# Patient Record
Sex: FEMALE | Race: OTHER | HISPANIC OR LATINO
[De-identification: names, ages, dates, MRNs, and addresses within clinical notes are randomized per-mention and may not be internally consistent; named-entity substitution may affect disease eponyms.]

---

## 2017-05-15 ENCOUNTER — RESULT REVIEW (OUTPATIENT)
Age: 32
End: 2017-05-15

## 2018-01-17 ENCOUNTER — RESULT REVIEW (OUTPATIENT)
Age: 33
End: 2018-01-17

## 2018-05-11 ENCOUNTER — EMERGENCY (EMERGENCY)
Facility: HOSPITAL | Age: 33
LOS: 0 days | Discharge: HOME | End: 2018-05-11
Attending: EMERGENCY MEDICINE | Admitting: EMERGENCY MEDICINE
Payer: MEDICAID

## 2018-05-11 VITALS
TEMPERATURE: 98 F | HEART RATE: 106 BPM | OXYGEN SATURATION: 100 % | DIASTOLIC BLOOD PRESSURE: 97 MMHG | RESPIRATION RATE: 18 BRPM | SYSTOLIC BLOOD PRESSURE: 134 MMHG

## 2018-05-11 VITALS — WEIGHT: 130.07 LBS | HEIGHT: 63 IN

## 2018-05-11 DIAGNOSIS — R31.9 HEMATURIA, UNSPECIFIED: ICD-10-CM

## 2018-05-11 DIAGNOSIS — B96.20 UNSPECIFIED ESCHERICHIA COLI [E. COLI] AS THE CAUSE OF DISEASES CLASSIFIED ELSEWHERE: ICD-10-CM

## 2018-05-11 DIAGNOSIS — N39.0 URINARY TRACT INFECTION, SITE NOT SPECIFIED: ICD-10-CM

## 2018-05-11 LAB
APPEARANCE UR: CLEAR — SIGNIFICANT CHANGE UP
BACTERIA # UR AUTO: (no result)
BILIRUB UR-MCNC: NEGATIVE — SIGNIFICANT CHANGE UP
COLOR SPEC: (no result)
DIFF PNL FLD: (no result)
EPI CELLS # UR: SIGNIFICANT CHANGE UP
GLUCOSE UR QL: NEGATIVE MG/DL — SIGNIFICANT CHANGE UP
KETONES UR-MCNC: NEGATIVE — SIGNIFICANT CHANGE UP
LEUKOCYTE ESTERASE UR-ACNC: (no result)
NITRITE UR-MCNC: NEGATIVE — SIGNIFICANT CHANGE UP
PH UR: 8 — SIGNIFICANT CHANGE UP (ref 5–8)
PROT UR-MCNC: 100 MG/DL
RBC CASTS # UR COMP ASSIST: SIGNIFICANT CHANGE UP /HPF (ref 0–4)
SP GR SPEC: 1.01 — SIGNIFICANT CHANGE UP (ref 1.01–1.02)
UROBILINOGEN FLD QL: NEGATIVE MG/DL — SIGNIFICANT CHANGE UP
WBC UR QL: (no result)

## 2018-05-11 PROCEDURE — 99283 EMERGENCY DEPT VISIT LOW MDM: CPT

## 2018-05-11 RX ORDER — PHENAZOPYRIDINE HCL 100 MG
1 TABLET ORAL
Qty: 5 | Refills: 0
Start: 2018-05-11

## 2018-05-11 RX ORDER — NITROFURANTOIN MACROCRYSTAL 50 MG
1 CAPSULE ORAL
Qty: 14 | Refills: 0
Start: 2018-05-11 | End: 2018-05-17

## 2018-05-11 RX ORDER — PHENAZOPYRIDINE HCL 100 MG
200 TABLET ORAL ONCE
Qty: 0 | Refills: 0 | Status: COMPLETED | OUTPATIENT
Start: 2018-05-11 | End: 2018-05-11

## 2018-05-11 RX ORDER — NITROFURANTOIN MACROCRYSTAL 50 MG
100 CAPSULE ORAL
Qty: 0 | Refills: 0 | Status: DISCONTINUED | OUTPATIENT
Start: 2018-05-11 | End: 2018-05-11

## 2018-05-11 RX ADMIN — Medication 200 MILLIGRAM(S): at 22:56

## 2018-05-11 RX ADMIN — Medication 100 MILLIGRAM(S): at 22:56

## 2018-05-11 NOTE — ED STATDOCS - PROGRESS NOTE DETAILS
reviewed us results with patient, ucg negative, +RBC and WBC.  Will treat UTI with macrobid and pyridium for the pain -Marcie Velasco PA-C

## 2019-03-12 ENCOUNTER — RESULT REVIEW (OUTPATIENT)
Age: 34
End: 2019-03-12

## 2019-05-28 ENCOUNTER — APPOINTMENT (OUTPATIENT)
Dept: ULTRASOUND IMAGING | Facility: CLINIC | Age: 34
End: 2019-05-28
Payer: COMMERCIAL

## 2019-05-28 ENCOUNTER — OUTPATIENT (OUTPATIENT)
Dept: OUTPATIENT SERVICES | Facility: HOSPITAL | Age: 34
LOS: 1 days | End: 2019-05-28
Payer: COMMERCIAL

## 2019-05-28 DIAGNOSIS — Z00.8 ENCOUNTER FOR OTHER GENERAL EXAMINATION: ICD-10-CM

## 2019-05-28 PROCEDURE — 76641 ULTRASOUND BREAST COMPLETE: CPT | Mod: 26,RT

## 2019-05-28 PROCEDURE — 76641 ULTRASOUND BREAST COMPLETE: CPT

## 2021-05-18 ENCOUNTER — TRANSCRIPTION ENCOUNTER (OUTPATIENT)
Age: 36
End: 2021-05-18

## 2021-05-25 ENCOUNTER — EMERGENCY (EMERGENCY)
Facility: HOSPITAL | Age: 36
LOS: 0 days | Discharge: HOME | End: 2021-05-25
Attending: HOSPITALIST
Payer: MEDICAID

## 2021-05-25 VITALS — HEIGHT: 63 IN | WEIGHT: 139.99 LBS

## 2021-05-25 VITALS
RESPIRATION RATE: 17 BRPM | DIASTOLIC BLOOD PRESSURE: 84 MMHG | SYSTOLIC BLOOD PRESSURE: 119 MMHG | TEMPERATURE: 98 F | HEART RATE: 67 BPM | OXYGEN SATURATION: 100 %
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
## 2021-05-25 DIAGNOSIS — M25.562 PAIN IN LEFT KNEE: ICD-10-CM
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
## 2021-05-25 PROCEDURE — 99283 EMERGENCY DEPT VISIT LOW MDM: CPT

## 2021-05-25 PROCEDURE — 99282 EMERGENCY DEPT VISIT SF MDM: CPT

## 2021-05-25 NOTE — ED STATDOCS - CLINICAL SUMMARY MEDICAL DECISION MAKING FREE TEXT BOX
36F with left knee pain s/p mechanical fall 1 weeks ago. negative xrays. prn pain meds, ace support wrap, outpt f/u with Orthopedics.

## 2021-05-25 NOTE — ED STATDOCS - PATIENT PORTAL LINK FT
You can access the FollowMyHealth Patient Portal offered by [facility] by registering at the following website: [email or web address]. By joining [employer]’s FollowMyHealth portal, you will also be able to view your health information using other applications (apps) compatible with our system.

## 2021-05-25 NOTE — ED STATDOCS - CARE PROVIDER_API CALL
Brett Rogers (DO)  Orthopaedic Surgery  [address] [place] [address]  Phone: [phone]  Fax: [phone]  Follow Up Time:

## 2021-05-25 NOTE — ED STATDOCS - PROGRESS NOTE DETAILS
37 yo female presents with sister for L knee pain. Pt with mechanical trip and fall, where she inverted her ankle and hurt her knee pop. She had xrs which was unremarkable but states she hears something in her knee. Pt has been walking on it and is currently wearing a brace. Offered another XR but pt refused. WIll place name in referral book so she could have f/u with ortho. Pt has been taking ibuprofen and tylenol for pain. -Pancho Choi PA-C

## 2021-05-25 NOTE — ED STATDOCS - OBJECTIVE STATEMENT
36F with left knee pain s/p mechanical fall 1 weeks ago. went to urgent care, negative xrays of left knee and ankle. has been wearing an ace wrap and taking ibuprofen 600mg 2 times a day. states knee has been still hurting her. ambulating independently.

## 2021-05-25 NOTE — ED STATDOCS - NSFOLLOWUPINSTRUCTIONS_ED_ALL_ED_FT
Take tylenol as needed for pain, 650Mg every 6-8 hours.  You can also take ibuprofen as needed for pain, 600mg every 6-8 hours, take with food. Take tylenol as needed for pain, 650Mg every 6-8 hours.  You can also take ibuprofen as needed for pain, 600mg every 6-8 hours, take with food.    Someone from the hospital will call to set up an appointment with an orthopedist.     Return to the ER for any new or other concerns.

## 2021-05-28 ENCOUNTER — OUTPATIENT (OUTPATIENT)
Dept: OUTPATIENT SERVICES | Facility: HOSPITAL | Age: 36
LOS: 1 days | End: 2021-05-28
Payer: COMMERCIAL

## 2021-05-28 ENCOUNTER — APPOINTMENT (OUTPATIENT)
Dept: MRI IMAGING | Facility: CLINIC | Age: 36
End: 2021-05-28
Payer: COMMERCIAL

## 2021-05-28 DIAGNOSIS — Z00.8 ENCOUNTER FOR OTHER GENERAL EXAMINATION: ICD-10-CM

## 2021-05-28 PROCEDURE — 73721 MRI JNT OF LWR EXTRE W/O DYE: CPT | Mod: 26,LT

## 2021-05-28 PROCEDURE — 73721 MRI JNT OF LWR EXTRE W/O DYE: CPT

## 2021-08-25 NOTE — ED ADULT NURSE NOTE - CCCP TRG CHIEF CMPLNT
Cardiology has tried to contact pt and has mailed this pt a letter for her to contact the office about an appt.  urinary symptoms

## 2021-09-15 ENCOUNTER — OUTPATIENT (OUTPATIENT)
Dept: OUTPATIENT SERVICES | Facility: HOSPITAL | Age: 36
LOS: 1 days | End: 2021-09-15
Payer: SELF-PAY

## 2021-09-15 ENCOUNTER — APPOINTMENT (OUTPATIENT)
Dept: ORTHOPEDIC SURGERY | Facility: HOSPITAL | Age: 36
End: 2021-09-15

## 2021-09-15 VITALS
HEART RATE: 64 BPM | TEMPERATURE: 96.3 F | DIASTOLIC BLOOD PRESSURE: 86 MMHG | SYSTOLIC BLOOD PRESSURE: 130 MMHG | HEIGHT: 60 IN | WEIGHT: 120 LBS | BODY MASS INDEX: 23.56 KG/M2

## 2021-09-15 DIAGNOSIS — M79.643 PAIN IN UNSPECIFIED HAND: ICD-10-CM

## 2021-09-15 DIAGNOSIS — S83.512A SPRAIN OF ANTERIOR CRUCIATE LIGAMENT OF LEFT KNEE, INITIAL ENCOUNTER: ICD-10-CM

## 2021-09-15 PROCEDURE — G0463: CPT

## 2021-09-26 ENCOUNTER — OUTPATIENT (OUTPATIENT)
Dept: OUTPATIENT SERVICES | Facility: HOSPITAL | Age: 36
LOS: 1 days | End: 2021-09-26
Payer: COMMERCIAL

## 2021-09-26 ENCOUNTER — APPOINTMENT (OUTPATIENT)
Dept: ULTRASOUND IMAGING | Facility: CLINIC | Age: 36
End: 2021-09-26
Payer: COMMERCIAL

## 2021-09-26 DIAGNOSIS — Z01.411 ENCOUNTER FOR GYNECOLOGICAL EXAMINATION (GENERAL) (ROUTINE) WITH ABNORMAL FINDINGS: ICD-10-CM

## 2021-09-26 DIAGNOSIS — N92.0 EXCESSIVE AND FREQUENT MENSTRUATION WITH REGULAR CYCLE: ICD-10-CM

## 2021-09-26 PROCEDURE — 76856 US EXAM PELVIC COMPLETE: CPT

## 2021-09-26 PROCEDURE — 76830 TRANSVAGINAL US NON-OB: CPT | Mod: 26

## 2021-09-26 PROCEDURE — 76830 TRANSVAGINAL US NON-OB: CPT

## 2021-09-26 PROCEDURE — 76856 US EXAM PELVIC COMPLETE: CPT | Mod: 26

## 2021-09-27 ENCOUNTER — NON-APPOINTMENT (OUTPATIENT)
Age: 36
End: 2021-09-27

## 2021-09-27 ENCOUNTER — APPOINTMENT (OUTPATIENT)
Dept: ORTHOPEDIC SURGERY | Facility: CLINIC | Age: 36
End: 2021-09-27
Payer: MEDICAID

## 2021-09-27 VITALS
BODY MASS INDEX: 25.52 KG/M2 | SYSTOLIC BLOOD PRESSURE: 143 MMHG | HEART RATE: 75 BPM | WEIGHT: 130 LBS | HEIGHT: 60 IN | DIASTOLIC BLOOD PRESSURE: 83 MMHG

## 2021-09-27 DIAGNOSIS — S83.512D SPRAIN OF ANTERIOR CRUCIATE LIGAMENT OF LEFT KNEE, SUBSEQUENT ENCOUNTER: ICD-10-CM

## 2021-09-27 PROCEDURE — 99024 POSTOP FOLLOW-UP VISIT: CPT

## 2021-10-02 ENCOUNTER — EMERGENCY (EMERGENCY)
Facility: HOSPITAL | Age: 36
LOS: 0 days | Discharge: HOME | End: 2021-10-02
Attending: STUDENT IN AN ORGANIZED HEALTH CARE EDUCATION/TRAINING PROGRAM
Payer: MEDICAID

## 2021-10-02 VITALS
SYSTOLIC BLOOD PRESSURE: 123 MMHG | HEART RATE: 59 BPM | WEIGHT: 130.07 LBS | HEIGHT: 63 IN | TEMPERATURE: 98 F | RESPIRATION RATE: 18 BRPM | DIASTOLIC BLOOD PRESSURE: 85 MMHG | OXYGEN SATURATION: 100 %

## 2021-10-02 DIAGNOSIS — N39.0 URINARY TRACT INFECTION, SITE NOT SPECIFIED: ICD-10-CM

## 2021-10-02 DIAGNOSIS — R35.0 FREQUENCY OF MICTURITION: ICD-10-CM

## 2021-10-02 DIAGNOSIS — R11.0 NAUSEA: ICD-10-CM

## 2021-10-02 DIAGNOSIS — M54.50 LOW BACK PAIN, UNSPECIFIED: ICD-10-CM

## 2021-10-02 LAB
APPEARANCE UR: CLEAR — SIGNIFICANT CHANGE UP
BILIRUB UR-MCNC: NEGATIVE — SIGNIFICANT CHANGE UP
COLOR SPEC: YELLOW — SIGNIFICANT CHANGE UP
DIFF PNL FLD: ABNORMAL
GLUCOSE UR QL: NEGATIVE MG/DL — SIGNIFICANT CHANGE UP
KETONES UR-MCNC: NEGATIVE — SIGNIFICANT CHANGE UP
LEUKOCYTE ESTERASE UR-ACNC: ABNORMAL
NITRITE UR-MCNC: NEGATIVE — SIGNIFICANT CHANGE UP
PH UR: 7 — SIGNIFICANT CHANGE UP (ref 5–8)
PROT UR-MCNC: 15 MG/DL
SP GR SPEC: 1.01 — SIGNIFICANT CHANGE UP (ref 1.01–1.02)
UROBILINOGEN FLD QL: NEGATIVE MG/DL — SIGNIFICANT CHANGE UP

## 2021-10-02 PROCEDURE — 81001 URINALYSIS AUTO W/SCOPE: CPT

## 2021-10-02 PROCEDURE — 87086 URINE CULTURE/COLONY COUNT: CPT

## 2021-10-02 PROCEDURE — 81025 URINE PREGNANCY TEST: CPT

## 2021-10-02 PROCEDURE — 99284 EMERGENCY DEPT VISIT MOD MDM: CPT

## 2021-10-02 PROCEDURE — 99283 EMERGENCY DEPT VISIT LOW MDM: CPT

## 2021-10-02 RX ORDER — CEPHALEXIN 500 MG
500 CAPSULE ORAL ONCE
Refills: 0 | Status: COMPLETED | OUTPATIENT
Start: 2021-10-02 | End: 2021-10-02

## 2021-10-02 RX ORDER — IBUPROFEN 200 MG
600 TABLET ORAL ONCE
Refills: 0 | Status: COMPLETED | OUTPATIENT
Start: 2021-10-02 | End: 2021-10-02

## 2021-10-02 RX ORDER — PHENAZOPYRIDINE HCL 100 MG
1 TABLET ORAL
Qty: 6 | Refills: 0
Start: 2021-10-02 | End: 2021-10-03

## 2021-10-02 RX ORDER — ACETAMINOPHEN 500 MG
975 TABLET ORAL ONCE
Refills: 0 | Status: COMPLETED | OUTPATIENT
Start: 2021-10-02 | End: 2021-10-02

## 2021-10-02 RX ORDER — LIDOCAINE 4 G/100G
1 CREAM TOPICAL ONCE
Refills: 0 | Status: COMPLETED | OUTPATIENT
Start: 2021-10-02 | End: 2021-10-02

## 2021-10-02 RX ORDER — CEPHALEXIN 500 MG
1 CAPSULE ORAL
Qty: 14 | Refills: 0
Start: 2021-10-02 | End: 2021-10-08

## 2021-10-02 RX ADMIN — Medication 600 MILLIGRAM(S): at 17:51

## 2021-10-02 RX ADMIN — LIDOCAINE 1 PATCH: 4 CREAM TOPICAL at 17:52

## 2021-10-02 RX ADMIN — Medication 500 MILLIGRAM(S): at 18:44

## 2021-10-02 RX ADMIN — Medication 975 MILLIGRAM(S): at 17:51

## 2021-10-02 NOTE — ED STATDOCS - PROGRESS NOTE DETAILS
Pt. is a 36 year old female presenting with lower back pain with urinary frequency.  Symptoms x 3 days.  Pyridium taken at home without relief.  Mild nausea today and chills.  Pt. currently menstruating.  Neg. Fever, dizziness or chest pain.  Denies edema. Denies dysuria, hematuria, BRBPR, tarry stools, diarrhea, constipation. Denies  discharge.  Tatyana Fu PA-C UTI on urinalysis.  Will treat with Keflex and urology follow up.   Pyridium at home.  Tatyana Fu PA-C

## 2021-10-02 NOTE — ED STATDOCS - ATTENDING CONTRIBUTION TO CARE
Harvinder ROCK: I performed the initial face to face bedside interview with this patient regarding history of present illness, review of symptoms and relevant past medical, social and family history.  I completed an independent physical examination.  I was the initial provider who evaluated this patient. I have signed out the follow up of any pending tests (i.e. labs, radiological studies) to the ACP.  I have communicated the patient’s plan of care and disposition with the ACP.  The history, relevant review of systems, past medical and surgical history, medical decision making, and physical examination was documented by the scribe in my presence and I attest to the accuracy of the documentation.

## 2021-10-02 NOTE — ED STATDOCS - NSFOLLOWUPINSTRUCTIONS_ED_ALL_ED_FT
Infección urinaria en los adultos    Urinary Tract Infection, Adult       Rabia infección urinaria (IU) puede ocurrir en cualquier lugar de las vías urinarias. Las vías urinarias incluyen a los riñones, los uréteres, la vejiga y la uretra. Estos órganos fabrican, almacenan y eliminan la orina del organismo.    El médico puede usar otras palabras para describir la infección. La IU katya afecta los uréteres y a los riñones (pielonefritis). La IU baja afecta la vejiga (cistitis) y la uretra (uretritis).      ¿Cuáles son las causas?    La mayoría de las infecciones de las vías urinarias es causada por la presencia de bacterias en la daniel genital, alrededor de la entrada de las vías urinarias (uretra). Estas bacterias proliferan y causan inflamación en las vías urinarias.      ¿Qué incrementa el riesgo?  Es más probable que contraiga esta afección si:  •Tiene colocado un catéter urinario (sonda urinaria) permanente.      •No puede controlar cuándo orinar o defecar (tiene incontinencia).    •Es marychuy y usted:  •Utiliza espermicida o diafragma forrest método anticonceptivo.      •Tiene niveles bajos de estrógenos.      •Están embarazadas.        •Tiene ciertos genes que aumentan guzman riesgo (genética).      •Es sexualmente activa.      •Kelsie antibióticos.    •Tiene rabia afección que causa que el flujo de orina sea lento, forrest:  •Próstata agrandada, si usted es hombre.      •Obstrucción de la uretra (estenosis).      •Cálculo renal.      •Rabia afección nerviosa que afecta el control de la vejiga (vejiga neurógena).      •No marci lo suficiente o no orina con frecuencia.      •Tiene ciertas enfermedades crónicas, forrest:  •Diabetes.      •Un sistema que combate las enfermedades (sistemainmunitario) debilitado.      •Anemia drepanocítica.      •Gota.      •Lesión en la médula tilley.          ¿Cuáles son los signos o los síntomas?  Los síntomas de esta afección incluyen:  •Necesidad inmediata (urgente) de orinar.      •Micción frecuente o eliminación de pequeñas cantidades de orina con frecuencia.      •Ardor o dolor al orinar.      •Presencia de johan en la orina.      •Orina con mal olor u olor atípico.      •Dificultad para orinar.      •Orina turbia.      •Secreción vaginal, si es marychuy.      •Dolor en el abdomen o en la parte inferior de la espalda.    Es posible que también tenga:  •Vómitos o disminución del apetito.      •Confusión.      •Irritabilidad o cansancio.      •Fiebre.      •Diarrea.      El primer síntoma en los adultos mayores puede ser la confusión. En algunos casos, es posible que no tengan síntomas hasta que la infección empeore.      ¿Cómo se diagnostica?  Esta afección se diagnostica en función de rowdy antecedentes médicos y de un examen físico. También pueden hacerle otras pruebas, incluidas las siguientes:  •Análisis de orina.      •Análisis de johan.      •Pruebas de infecciones de transmisión sexual (ITS).      Si ha tenido más de rabia infección urinaria (IU), se pueden hacer estudios de diagnóstico por imágenes o rabai cistoscopia para determinar la causa de las infecciones.      ¿Cómo se trata?  El tratamiento de esta afección incluye lo siguiente:  •Antibióticos.      •Medicamentos de venta mu para aliviar las molestias.      •Beber rabia cantidad suficiente agua para mantenerse hidratado.      Si tiene infecciones con frecuencia o tiene otras afecciones, forrest un cálculo renal, es posible que deba khai a un médico especialista en las vías urinarias (urólogo).    En casos poco frecuentes, las infecciones urinarias pueden provocar sepsis. La sepsis es rabia afección potencialmente mortal que se produce cuando el cuerpo responde a rabia infección. La sepsis se trata en el hospital con antibióticos, líquidos y otros medicamentos que se administran por vía intravenosa.      Sigue estas instrucciones en tu casa:     Medicamentos     •Kelsie los medicamentos de venta mu y los recetados solamente forrest se lo haya indicado el médico.      •Si le recetaron un antibiótico, tómelo forrest se lo haya indicado el médico. No deje de usar el antibiótico aunque comience a sentirse mejor.      Indicaciones generales   •Asegúrese de hacer lo siguiente:  •Vaciar la vejiga con frecuencia y en guzman totalidad. No contener la orina adán largos períodos.      •Vaciar la vejiga después de tener sexo.      •Limpiarse de adelante hacia atrás después de defecar, si es marychuy. Use cada trozo de papel higiénico solo rabia vez cuando se limpie.        •Nathalie suficiente líquido forrest para mantener la orina de color amarillo pálido.      •Concurrir a todas las visitas de seguimiento forrest se lo haya indicado el médico. [place] es importante.        Comunícate con un médico si:    •Los síntomas no mejoran después de 1 o 2 días de tratamiento.      •Los síntomas desaparecen y luego vuelven a aparecer.        Solicite ayuda inmediatamente si tiene:    •Dolor intenso en la espalda o en la parte inferior del abdomen.      •Fiebre.      •Náuseas o vómitos.        Resumen    •Rabia infección urinaria (IU) es rabia infección en cualquier parte de las vías urinarias, que incluyen los riñones, los uréteres, la vejiga y la uretra.      •La mayoría de las infecciones de las vías urinarias es causada por la presencia de bacterias en la daniel genital, alrededor de la entrada de las vías urinarias (uretra).      •El tratamiento de esta afección suele incluir antibióticos.      •Si le recetaron un antibiótico, tómelo forrest se lo haya indicado el médico. No deje de usar el antibiótico aunque comience a sentirse mejor.      •Concurrir a todas las visitas de seguimiento forrest se lo haya indicado el médico. [place] es importante.      Esta información no tiene forrest fin reemplazar el consejo del médico. Asegúrese de hacerle al médico cualquier pregunta que tenga.

## 2021-10-02 NOTE — ED STATDOCS - CLINICAL SUMMARY MEDICAL DECISION MAKING FREE TEXT BOX
UTI on urinalysis.  Will treat with Keflex and urology follow up.   Pyridium at home.  Tatyana Fu PA-C UTI on urinalysis.  Will treat with Keflex and urology follow up.   Pyridium at home.  Tatyana Blanton MD: 37 y/o female with no pertinent PMHx presents to the ED presents to the bilateral lower back pain associated with increased urinary frequency x3 days. Took peridium at home with no relief. Today, had mild nausea and chills, prompting ED visit. Currently on menstrual cycle. No other complaints. exam vss non toxic PE as above ddx c/f msk sprain/strain vs UTI given freq and suprapubic pain will check urine meds as needed reassess, low c/f intraabdominal surgical pathology.

## 2021-10-02 NOTE — ED STATDOCS - PATIENT PORTAL LINK FT
You can access the FollowMyHealth Patient Portal offered by [facility] by registering at the following website: [email or web address]. By joining [employer]’s FollowMyHealth portal, you will also be able to view your health information using other applications (apps) compatible with our system.

## 2021-10-02 NOTE — ED STATDOCS - OBJECTIVE STATEMENT
37 y/o female presents to the ED presents to the bilateral lower back pain associated with increased urinary frequency x3 days. Took peridium at home with no relief. Today, had mild nausea and chills, propmting ED visit. Currently on menstrual cycle. No other injuries or complaints. Denies n/v/f/c/cp/sob. Denies headache, syncope, lightheadedness, dizziness. Denies chest palpitations, abdominal pain. Denies edema. Denies dysuria, hematuria, BRBPR, tarry stools, diarrhea, constipation. Denies  discharge. 35 y/o female with no pertinent PMHx presents to the ED presents to the bilateral lower back pain associated with increased urinary frequency x3 days. Took peridium at home with no relief. Today, had mild nausea and chills, prompting ED visit. Currently on menstrual cycle. No other complaints. Denies f/cp/sob. Denies headache, syncope, lightheadedness, dizziness. Denies chest palpitations, abdominal pain. Denies edema. Denies dysuria, hematuria, BRBPR, tarry stools, diarrhea, constipation. Denies  discharge.

## 2021-10-02 NOTE — ED STATDOCS - PHYSICAL EXAMINATION
VITALS: Initial triage and subsequent vitals have been reviewed by me.  GEN APPEARANCE: WDWN, alert and cooperative, non-toxic appearing and in NAD  HEAD: Atraumatic, normocephalic   EYES: PERRLa, EOMI, vision grossly intact.   EARS: Gross hearing intact.   NOSE: No nasal discharge, no external evidence of epistaxis.   NECK: Supple  CV: RRR, S1S2, no c/r/m/g. No cyanosis or pallor. Extremities warm, well perfused. Cap refill <2 seconds. No bruits.   LUNGS: CTAB. No wheezing. No rales. No rhonchi. No diminished breath sounds.   ABDOMEN: Soft, ND. No guarding or rebound. No masses. +Mild suprapubic pain  MSK/EXT: Spine appears normal, no spine point tenderness. No CVA ttp. Normal muscular development. Pelvis stable. No obvious joint or bony deformity, no peripheral edema.   NEURO: Alert, follows commands. Weight bearing normal. Speech normal. Sensation and motor normal x4 extremities.   SKIN: Normal color for race, warm, dry and intact. No evidence of rash.  PSYCH: Normal mood and affect.

## 2021-10-02 NOTE — ED STATDOCS - CARE PROVIDER_API CALL
Deepak Nicole)  Urology  [address], [address] [place] [address]  Phone: [phone]  Fax: [phone]  Follow Up Time:

## 2021-10-02 NOTE — ED ADULT TRIAGE NOTE - CHIEF COMPLAINT QUOTE
patient complaining of lower back pain, dizziness, nausea, urinary frequency and incomplete emptying. took Azo OTC med PTA without relief. denies fevers

## 2021-10-05 ENCOUNTER — OUTPATIENT (OUTPATIENT)
Dept: OUTPATIENT SERVICES | Facility: HOSPITAL | Age: 36
LOS: 1 days | End: 2021-10-05

## 2021-10-05 VITALS
DIASTOLIC BLOOD PRESSURE: 70 MMHG | WEIGHT: 130.07 LBS | HEART RATE: 78 BPM | HEIGHT: 59 IN | TEMPERATURE: 98 F | OXYGEN SATURATION: 97 % | RESPIRATION RATE: 16 BRPM | SYSTOLIC BLOOD PRESSURE: 100 MMHG

## 2021-10-05 DIAGNOSIS — S83.512A SPRAIN OF ANTERIOR CRUCIATE LIGAMENT OF LEFT KNEE, INITIAL ENCOUNTER: ICD-10-CM

## 2021-10-05 LAB
CULTURE RESULTS: SIGNIFICANT CHANGE UP
HCG UR QL: NEGATIVE — SIGNIFICANT CHANGE UP
HCT VFR BLD CALC: 40.8 % — SIGNIFICANT CHANGE UP (ref 34.5–45)
HGB BLD-MCNC: 15 G/DL — SIGNIFICANT CHANGE UP (ref 11.5–15.5)
MCHC RBC-ENTMCNC: 32.9 PG — SIGNIFICANT CHANGE UP (ref 27–34)
MCHC RBC-ENTMCNC: 36.8 GM/DL — HIGH (ref 32–36)
MCV RBC AUTO: 89.5 FL — SIGNIFICANT CHANGE UP (ref 80–100)
NRBC # BLD: 0 /100 WBCS — SIGNIFICANT CHANGE UP
NRBC # FLD: 0 K/UL — SIGNIFICANT CHANGE UP
PLATELET # BLD AUTO: 253 K/UL — SIGNIFICANT CHANGE UP (ref 150–400)
RBC # BLD: 4.56 M/UL — SIGNIFICANT CHANGE UP (ref 3.8–5.2)
RBC # FLD: 11.7 % — SIGNIFICANT CHANGE UP (ref 10.3–14.5)
SPECIMEN SOURCE: SIGNIFICANT CHANGE UP
WBC # BLD: 7.52 K/UL — SIGNIFICANT CHANGE UP (ref 3.8–10.5)
WBC # FLD AUTO: 7.52 K/UL — SIGNIFICANT CHANGE UP (ref 3.8–10.5)

## 2021-10-05 NOTE — H&P PST ADULT - ASSESSMENT
35 y/o female presents with pre op diagnosis: sprain anterior cruciate ligament left knee initial enc

## 2021-10-05 NOTE — H&P PST ADULT - MUSCULOSKELETAL
details… detailed exam left knee/no joint erythema/no joint warmth/no calf tenderness/decreased ROM due to pain/joint swelling/diminished strength

## 2021-10-05 NOTE — H&P PST ADULT - PROBLEM SELECTOR PLAN 1
Schedule for left knee arthroscopic anterior cruciate ligament reconstruction with Hamstring Autograft tentatively on 10/19/2021. Pre op instructions, famotidine, chlorhexidine gluconate soap given and explained. Pt verbalized understanding.  Pt instructed to bring urine specimen on day of surgery, urine cup given to pt who verbalized understanding.   Pt confirmed schedule appt for Covid test pre op

## 2021-10-05 NOTE — H&P PST ADULT - NSANTHOSAYNRD_GEN_A_CORE
No. STEFFEN screening performed.  STOP BANG Legend: 0-2 = LOW Risk; 3-4 = INTERMEDIATE Risk; 5-8 = HIGH Risk

## 2021-10-05 NOTE — H&P PST ADULT - HISTORY OF PRESENT ILLNESS
37 y/o female presents to [facility] for pre op evaluation with hx of left knee injury sustained while playing soccer on 05/20/2021. pt was evaluated at [facility] ED. Pre op diagnosis: D 37 y/o female presents to PST for pre op evaluation with hx of left knee injury sustained while playing soccer on 05/20/2021. pt was evaluated at [facility] ED. Pre op diagnosis: Diagnosis: sprain anterior cruciate ligament left knee initial enc. Schedule for left knee arthroscopy ACL reconstruction with Hamstring Autograft 35 y/o female presents to PST for pre op evaluation with hx of left knee injury sustained while playing soccer on 05/20/2021. Pt was evaluated at [facility] ED. Pre op diagnosis: Diagnosis: sprain anterior cruciate ligament left knee initial enc. Schedule for left knee arthroscopic ACL reconstruction with Hamstring Autograft

## 2021-10-07 PROBLEM — N39.0 URINARY TRACT INFECTION, SITE NOT SPECIFIED: Chronic | Status: ACTIVE | Noted: 2021-10-05

## 2021-10-16 ENCOUNTER — APPOINTMENT (OUTPATIENT)
Dept: DISASTER EMERGENCY | Facility: CLINIC | Age: 36
End: 2021-10-16

## 2021-10-23 ENCOUNTER — APPOINTMENT (OUTPATIENT)
Dept: DISASTER EMERGENCY | Facility: CLINIC | Age: 36
End: 2021-10-23

## 2021-10-23 DIAGNOSIS — Z01.818 ENCOUNTER FOR OTHER PREPROCEDURAL EXAMINATION: ICD-10-CM

## 2021-10-24 LAB — SARS-COV-2 N GENE NPH QL NAA+PROBE: NOT DETECTED

## 2021-10-25 ENCOUNTER — TRANSCRIPTION ENCOUNTER (OUTPATIENT)
Age: 36
End: 2021-10-25

## 2021-10-25 VITALS
RESPIRATION RATE: 16 BRPM | WEIGHT: 130.07 LBS | TEMPERATURE: 98 F | DIASTOLIC BLOOD PRESSURE: 64 MMHG | SYSTOLIC BLOOD PRESSURE: 110 MMHG | OXYGEN SATURATION: 100 % | HEART RATE: 56 BPM

## 2021-10-25 RX ORDER — ACETAMINOPHEN 500 MG/1
500 TABLET ORAL EVERY 8 HOURS
Qty: 60 | Refills: 0 | Status: ACTIVE | COMMUNITY
Start: 2021-10-25 | End: 1900-01-01

## 2021-10-25 RX ORDER — TRAMADOL HYDROCHLORIDE 50 MG/1
50 TABLET, COATED ORAL
Qty: 15 | Refills: 0 | Status: ACTIVE | COMMUNITY
Start: 2021-10-25 | End: 1900-01-01

## 2021-10-25 RX ORDER — GABAPENTIN 100 MG/1
100 CAPSULE ORAL EVERY 8 HOURS
Qty: 30 | Refills: 0 | Status: ACTIVE | COMMUNITY
Start: 2021-10-25 | End: 1900-01-01

## 2021-10-25 RX ORDER — OXYCODONE 5 MG/1
5 TABLET ORAL EVERY 6 HOURS
Qty: 10 | Refills: 0 | Status: ACTIVE | COMMUNITY
Start: 2021-10-25 | End: 1900-01-01

## 2021-10-25 RX ORDER — MELOXICAM 15 MG/1
15 TABLET ORAL DAILY
Qty: 15 | Refills: 0 | Status: ACTIVE | COMMUNITY
Start: 2021-10-25 | End: 1900-01-01

## 2021-10-25 RX ORDER — ONDANSETRON 4 MG/1
4 TABLET ORAL 3 TIMES DAILY
Qty: 15 | Refills: 0 | Status: ACTIVE | COMMUNITY
Start: 2021-10-25 | End: 1900-01-01

## 2021-10-26 ENCOUNTER — APPOINTMENT (OUTPATIENT)
Dept: ORTHOPEDIC SURGERY | Facility: AMBULATORY SURGERY CENTER | Age: 36
End: 2021-10-26

## 2021-10-26 ENCOUNTER — OUTPATIENT (OUTPATIENT)
Dept: OUTPATIENT SERVICES | Facility: HOSPITAL | Age: 36
LOS: 1 days | Discharge: HOME | End: 2021-10-26
Payer: COMMERCIAL

## 2021-10-26 VITALS
RESPIRATION RATE: 15 BRPM | SYSTOLIC BLOOD PRESSURE: 101 MMHG | DIASTOLIC BLOOD PRESSURE: 57 MMHG | HEART RATE: 63 BPM | TEMPERATURE: 97 F | OXYGEN SATURATION: 100 %

## 2021-10-26 DIAGNOSIS — S83.512A SPRAIN OF ANTERIOR CRUCIATE LIGAMENT OF LEFT KNEE, INITIAL ENCOUNTER: ICD-10-CM

## 2021-10-26 PROCEDURE — 29888 ARTHRS AID ACL RPR/AGMNTJ: CPT | Mod: LT

## 2021-10-26 RX ORDER — PHENAZOPYRIDINE HCL 100 MG
1 TABLET ORAL
Qty: 0 | Refills: 0 | DISCHARGE

## 2021-10-26 RX ORDER — CHOLECALCIFEROL (VITAMIN D3) 125 MCG
1 CAPSULE ORAL
Qty: 0 | Refills: 0 | DISCHARGE

## 2021-10-26 RX ORDER — ASCORBIC ACID 60 MG
1 TABLET,CHEWABLE ORAL
Qty: 0 | Refills: 0 | DISCHARGE

## 2021-10-26 RX ORDER — OMEGA-3 ACID ETHYL ESTERS 1 G
1 CAPSULE ORAL
Qty: 0 | Refills: 0 | DISCHARGE

## 2021-10-26 RX ORDER — CEPHALEXIN 500 MG
1 CAPSULE ORAL
Qty: 0 | Refills: 0 | DISCHARGE

## 2021-10-26 NOTE — ASU DISCHARGE PLAN (ADULT/PEDIATRIC) - CARE PROVIDER_API CALL
Santo Vásquez)  Orthopaedic Sports Medicine; Orthopaedic Surgery  [address] [place] [address]  Phone: [phone]  Fax: [phone]  Follow Up Time:

## 2021-10-27 RX ORDER — HYDROCODONE BITARTRATE AND ACETAMINOPHEN 5; 325 MG/1; MG/1
5-325 TABLET ORAL
Qty: 10 | Refills: 0 | Status: ACTIVE | COMMUNITY
Start: 2021-10-27 | End: 1900-01-01

## 2021-10-29 RX ORDER — DOCUSATE SODIUM 100 MG/1
100 CAPSULE ORAL 3 TIMES DAILY
Qty: 21 | Refills: 0 | Status: ACTIVE | COMMUNITY
Start: 2021-10-29 | End: 1900-01-01

## 2021-10-29 RX ORDER — BISACODYL 10 MG/1
10 SUPPOSITORY RECTAL DAILY
Qty: 3 | Refills: 0 | Status: ACTIVE | COMMUNITY
Start: 2021-10-29 | End: 1900-01-01

## 2021-10-29 RX ORDER — SENNOSIDES 8.6 MG TABLETS 8.6 MG/1
8.6 TABLET ORAL
Qty: 30 | Refills: 0 | Status: ACTIVE | COMMUNITY
Start: 2021-10-29 | End: 1900-01-01

## 2021-11-05 ENCOUNTER — APPOINTMENT (OUTPATIENT)
Dept: ORTHOPEDIC SURGERY | Facility: CLINIC | Age: 36
End: 2021-11-05
Payer: SELF-PAY

## 2021-11-05 VITALS — BODY MASS INDEX: 25.52 KG/M2 | WEIGHT: 130 LBS | HEIGHT: 60 IN

## 2021-11-05 VITALS — DIASTOLIC BLOOD PRESSURE: 78 MMHG | SYSTOLIC BLOOD PRESSURE: 124 MMHG | HEART RATE: 88 BPM

## 2021-11-05 PROCEDURE — 99024 POSTOP FOLLOW-UP VISIT: CPT

## 2021-11-29 ENCOUNTER — APPOINTMENT (OUTPATIENT)
Dept: ORTHOPEDIC SURGERY | Facility: CLINIC | Age: 36
End: 2021-11-29
Payer: SELF-PAY

## 2021-11-29 PROCEDURE — 99024 POSTOP FOLLOW-UP VISIT: CPT

## 2022-01-10 ENCOUNTER — APPOINTMENT (OUTPATIENT)
Dept: ORTHOPEDIC SURGERY | Facility: CLINIC | Age: 37
End: 2022-01-10
Payer: SELF-PAY

## 2022-01-10 DIAGNOSIS — S80.02XA CONTUSION OF LEFT KNEE, INITIAL ENCOUNTER: ICD-10-CM

## 2022-01-10 DIAGNOSIS — S83.412A SPRAIN OF MEDIAL COLLATERAL LIGAMENT OF LEFT KNEE, INITIAL ENCOUNTER: ICD-10-CM

## 2022-01-10 PROCEDURE — 99024 POSTOP FOLLOW-UP VISIT: CPT

## 2022-01-10 PROCEDURE — 73562 X-RAY EXAM OF KNEE 3: CPT | Mod: LT

## 2022-01-10 RX ORDER — NAPROXEN 500 MG/1
500 TABLET ORAL
Qty: 20 | Refills: 1 | Status: ACTIVE | COMMUNITY
Start: 2022-01-10 | End: 1900-01-01

## 2022-02-07 ENCOUNTER — APPOINTMENT (OUTPATIENT)
Dept: ORTHOPEDIC SURGERY | Facility: CLINIC | Age: 37
End: 2022-02-07
Payer: COMMERCIAL

## 2022-02-07 DIAGNOSIS — M65.9 SYNOVITIS AND TENOSYNOVITIS, UNSPECIFIED: ICD-10-CM

## 2022-02-07 PROCEDURE — 99213 OFFICE O/P EST LOW 20 MIN: CPT

## 2022-02-07 RX ORDER — NAPROXEN 500 MG/1
500 TABLET, DELAYED RELEASE ORAL
Qty: 40 | Refills: 0 | Status: ACTIVE | COMMUNITY
Start: 2022-02-07 | End: 1900-01-01

## 2022-02-10 ENCOUNTER — APPOINTMENT (OUTPATIENT)
Dept: COLORECTAL SURGERY | Facility: CLINIC | Age: 37
End: 2022-02-10
Payer: COMMERCIAL

## 2022-02-10 VITALS — HEART RATE: 78 BPM | SYSTOLIC BLOOD PRESSURE: 126 MMHG | DIASTOLIC BLOOD PRESSURE: 84 MMHG | OXYGEN SATURATION: 97 %

## 2022-02-10 DIAGNOSIS — K60.0 ACUTE ANAL FISSURE: ICD-10-CM

## 2022-02-10 PROCEDURE — 99202 OFFICE O/P NEW SF 15 MIN: CPT

## 2022-02-10 NOTE — PHYSICAL EXAM
[de-identified] : Soft, nondistended [de-identified] : External exam reveals benign-appearing small skin tag anteriorly.  Tenderness with just spreading of the buttocks.  Fissure partially visible at posterior aspect.  Severe tenderness replicated with palpation of posterior midline with a cotton-tipped applicator; tenderness to a lesser degree at the anterior midline as well.  No sentinel tag noted.  YAHAIRA and anoscopy deferred secondary to severe pain.

## 2022-02-10 NOTE — CONSULT LETTER
[Dear  ___] : Dear  [unfilled], [( Thank you for referring [unfilled] for consultation for _____ )] : Thank you for referring [unfilled] for consultation for [unfilled] [Please see my note below.] : Please see my note below. [Consult Closing:] : Thank you very much for allowing me to participate in the care of this patient.  If you have any questions, please do not hesitate to contact me. [Sincerely,] : Sincerely, [FreeTextEntry1] : She was diagnosed with acute anal fissure today. [FreeTextEntry3] : Rochelle Roper MD\par Colon and Rectal Surgery\par [facility]

## 2022-02-10 NOTE — HISTORY OF PRESENT ILLNESS
[FreeTextEntry1] : 36yoF with a a one-month history of intermittent perianal pain and bleeding.  She describes that a month ago she had a day of straining with resulting severe perianal pain that lasted only 2 days before resolving on its own.  She describes once weekly bright red bleeding on the toilet tissue and on the stool.  She also has severe pain a few times a week associated with bowel movements.  Endorses an external lump.  She was prescribed a hemorrhoid cream but the pain still persists.\par \par No fever, chills, or drainage other than blood.  \par \par Family history is significant for a paternal uncle who was diagnosed with colon cancer in his mid-30s.  She does not know if her parents have gotten their colonoscopies but as far as she knows no family history of polyps or colon cancer in them.  No personal or family history of IBD.\par \par She struggles with constipation, going every 1-3 days, sometimes very firm BMs.  She takes flax seeds and prune juice but is not on a regular bowel regimen.

## 2022-03-21 ENCOUNTER — APPOINTMENT (OUTPATIENT)
Dept: ORTHOPEDIC SURGERY | Facility: CLINIC | Age: 37
End: 2022-03-21
Payer: COMMERCIAL

## 2022-03-21 DIAGNOSIS — R29.898 OTHER SYMPTOMS AND SIGNS INVOLVING THE MUSCULOSKELETAL SYSTEM: ICD-10-CM

## 2022-03-21 PROCEDURE — 99213 OFFICE O/P EST LOW 20 MIN: CPT

## 2022-03-25 ENCOUNTER — APPOINTMENT (OUTPATIENT)
Dept: RADIOLOGY | Facility: CLINIC | Age: 37
End: 2022-03-25
Payer: COMMERCIAL

## 2022-03-25 ENCOUNTER — OUTPATIENT (OUTPATIENT)
Dept: OUTPATIENT SERVICES | Facility: HOSPITAL | Age: 37
LOS: 1 days | End: 2022-03-25
Payer: COMMERCIAL

## 2022-03-25 DIAGNOSIS — M54.9 DORSALGIA, UNSPECIFIED: ICD-10-CM

## 2022-03-25 PROCEDURE — 72100 X-RAY EXAM L-S SPINE 2/3 VWS: CPT

## 2022-03-25 PROCEDURE — 72100 X-RAY EXAM L-S SPINE 2/3 VWS: CPT | Mod: 26

## 2022-04-14 ENCOUNTER — APPOINTMENT (OUTPATIENT)
Dept: COLORECTAL SURGERY | Facility: CLINIC | Age: 37
End: 2022-04-14

## 2022-09-26 NOTE — H&P PST ADULT - NSICDXPASTMEDICALHX_GEN_ALL_CORE_FT
Recommend F2F visit, with whomever can get him in.   PAST MEDICAL HISTORY:  UTI (urinary tract infection)

## 2022-10-03 ENCOUNTER — APPOINTMENT (OUTPATIENT)
Dept: ORTHOPEDIC SURGERY | Facility: CLINIC | Age: 37
End: 2022-10-03

## 2022-10-03 VITALS
HEART RATE: 60 BPM | SYSTOLIC BLOOD PRESSURE: 134 MMHG | DIASTOLIC BLOOD PRESSURE: 85 MMHG | WEIGHT: 132 LBS | HEIGHT: 60 IN | BODY MASS INDEX: 25.91 KG/M2

## 2022-10-03 DIAGNOSIS — M54.42 LUMBAGO WITH SCIATICA, LEFT SIDE: ICD-10-CM

## 2022-10-03 DIAGNOSIS — Z98.890 OTHER SPECIFIED POSTPROCEDURAL STATES: ICD-10-CM

## 2022-10-03 PROCEDURE — 99213 OFFICE O/P EST LOW 20 MIN: CPT

## 2022-10-03 PROCEDURE — 73562 X-RAY EXAM OF KNEE 3: CPT | Mod: LT

## 2022-10-03 PROCEDURE — 72100 X-RAY EXAM L-S SPINE 2/3 VWS: CPT

## 2022-11-05 NOTE — H&P PST ADULT - HIV STATUS
Negative/Unknown You can access the FollowMyHealth Patient Portal offered by [facility] by registering at the following website: [email or web address]. By joining [employer]’s FollowMyHealth portal, you will also be able to view your health information using other applications (apps) compatible with our system.

## 2023-03-15 NOTE — ED STATDOCS - CROS ED ROS STATEMENT
Problem: Adult Inpatient Plan of Care  Goal: Plan of Care Review  Outcome: Ongoing, Progressing  Goal: Patient-Specific Goal (Individualized)  Outcome: Ongoing, Progressing  Goal: Absence of Hospital-Acquired Illness or Injury  Outcome: Ongoing, Progressing  Goal: Optimal Comfort and Wellbeing  Outcome: Ongoing, Progressing  Goal: Readiness for Transition of Care  Outcome: Ongoing, Progressing     Problem: Infection  Goal: Absence of Infection Signs and Symptoms  Outcome: Ongoing, Progressing     Problem:  Fall Injury Risk  Goal: Absence of Fall, Infant Drop and Related Injury  Outcome: Ongoing, Progressing     Problem: Bleeding (Labor)  Goal: Hemostasis  Outcome: Ongoing, Progressing     Problem: Change in Fetal Wellbeing (Labor)  Goal: Stable Fetal Wellbeing  Outcome: Ongoing, Progressing     Problem: Delayed Labor Progression (Labor)  Goal: Effective Progression to Delivery  Outcome: Ongoing, Progressing     Problem: Infection (Labor)  Goal: Absence of Infection Signs and Symptoms  Outcome: Ongoing, Progressing     Problem: Labor Pain (Labor)  Goal: Acceptable Pain Control  Outcome: Ongoing, Progressing     Problem: Uterine Tachysystole (Labor)  Goal: Normal Uterine Contraction Pattern  Outcome: Ongoing, Progressing      all other ROS negative except as per HPI

## 2023-04-03 ENCOUNTER — APPOINTMENT (OUTPATIENT)
Dept: ORTHOPEDIC SURGERY | Facility: CLINIC | Age: 38
End: 2023-04-03
Payer: COMMERCIAL

## 2023-04-03 VITALS
BODY MASS INDEX: 26.5 KG/M2 | HEIGHT: 60 IN | SYSTOLIC BLOOD PRESSURE: 123 MMHG | DIASTOLIC BLOOD PRESSURE: 80 MMHG | HEART RATE: 72 BPM | WEIGHT: 135 LBS

## 2023-04-03 DIAGNOSIS — M22.41 CHONDROMALACIA PATELLAE, RIGHT KNEE: ICD-10-CM

## 2023-04-03 DIAGNOSIS — M25.561 PAIN IN RIGHT KNEE: ICD-10-CM

## 2023-04-03 PROCEDURE — 73562 X-RAY EXAM OF KNEE 3: CPT | Mod: RT

## 2023-04-03 PROCEDURE — 99213 OFFICE O/P EST LOW 20 MIN: CPT

## 2023-05-05 NOTE — ED STATDOCS - DISCHARGE DATE
Principal Discharge DX:	Vacuum-assisted vaginal delivery  Assessment and plan of treatment:	monitor vaginal bleeding   nothing per vagina x 6 weeks  Secondary Diagnosis:	Gestational diabetes mellitus  Assessment and plan of treatment:	repeat OGT at 6wks   1 11-May-2018

## 2023-05-15 ENCOUNTER — APPOINTMENT (OUTPATIENT)
Dept: ORTHOPEDIC SURGERY | Facility: CLINIC | Age: 38
End: 2023-05-15

## 2023-11-08 ENCOUNTER — EMERGENCY (EMERGENCY)
Facility: HOSPITAL | Age: 38
LOS: 0 days | Discharge: HOME | End: 2023-11-08
Attending: EMERGENCY MEDICINE
Payer: MEDICAID

## 2023-11-08 VITALS
OXYGEN SATURATION: 99 % | HEART RATE: 75 BPM | RESPIRATION RATE: 18 BRPM | DIASTOLIC BLOOD PRESSURE: 95 MMHG | TEMPERATURE: 98 F | SYSTOLIC BLOOD PRESSURE: 139 MMHG

## 2023-11-08 DIAGNOSIS — G43.909 MIGRAINE, UNSPECIFIED, NOT INTRACTABLE, WITHOUT STATUS MIGRAINOSUS: ICD-10-CM

## 2023-11-08 DIAGNOSIS — R20.2 PARESTHESIA OF SKIN: ICD-10-CM

## 2023-11-08 DIAGNOSIS — R51.9 HEADACHE, UNSPECIFIED: ICD-10-CM

## 2023-11-08 LAB
ALBUMIN SERPL ELPH-MCNC: 3.6 G/DL — SIGNIFICANT CHANGE UP (ref 3.3–5)
ALBUMIN SERPL ELPH-MCNC: 3.6 G/DL — SIGNIFICANT CHANGE UP (ref 3.3–5)
ALP SERPL-CCNC: 57 U/L — SIGNIFICANT CHANGE UP (ref 40–120)
ALP SERPL-CCNC: 57 U/L — SIGNIFICANT CHANGE UP (ref 40–120)
ALT FLD-CCNC: 23 U/L — SIGNIFICANT CHANGE UP (ref 12–78)
ALT FLD-CCNC: 23 U/L — SIGNIFICANT CHANGE UP (ref 12–78)
ANION GAP SERPL CALC-SCNC: 6 MMOL/L — SIGNIFICANT CHANGE UP (ref 5–17)
ANION GAP SERPL CALC-SCNC: 6 MMOL/L — SIGNIFICANT CHANGE UP (ref 5–17)
APPEARANCE UR: CLEAR — SIGNIFICANT CHANGE UP
APPEARANCE UR: CLEAR — SIGNIFICANT CHANGE UP
AST SERPL-CCNC: 14 U/L — LOW (ref 15–37)
AST SERPL-CCNC: 14 U/L — LOW (ref 15–37)
BASOPHILS # BLD AUTO: 0.02 K/UL — SIGNIFICANT CHANGE UP (ref 0–0.2)
BASOPHILS # BLD AUTO: 0.02 K/UL — SIGNIFICANT CHANGE UP (ref 0–0.2)
BASOPHILS NFR BLD AUTO: 0.2 % — SIGNIFICANT CHANGE UP (ref 0–2)
BASOPHILS NFR BLD AUTO: 0.2 % — SIGNIFICANT CHANGE UP (ref 0–2)
BILIRUB SERPL-MCNC: 0.3 MG/DL — SIGNIFICANT CHANGE UP (ref 0.2–1.2)
BILIRUB SERPL-MCNC: 0.3 MG/DL — SIGNIFICANT CHANGE UP (ref 0.2–1.2)
BILIRUB UR-MCNC: NEGATIVE — SIGNIFICANT CHANGE UP
BILIRUB UR-MCNC: NEGATIVE — SIGNIFICANT CHANGE UP
BUN SERPL-MCNC: 8 MG/DL — SIGNIFICANT CHANGE UP (ref 7–23)
BUN SERPL-MCNC: 8 MG/DL — SIGNIFICANT CHANGE UP (ref 7–23)
CALCIUM SERPL-MCNC: 8.5 MG/DL — SIGNIFICANT CHANGE UP (ref 8.5–10.1)
CALCIUM SERPL-MCNC: 8.5 MG/DL — SIGNIFICANT CHANGE UP (ref 8.5–10.1)
CHLORIDE SERPL-SCNC: 109 MMOL/L — HIGH (ref 96–108)
CHLORIDE SERPL-SCNC: 109 MMOL/L — HIGH (ref 96–108)
CO2 SERPL-SCNC: 25 MMOL/L — SIGNIFICANT CHANGE UP (ref 22–31)
CO2 SERPL-SCNC: 25 MMOL/L — SIGNIFICANT CHANGE UP (ref 22–31)
COLOR SPEC: YELLOW — SIGNIFICANT CHANGE UP
COLOR SPEC: YELLOW — SIGNIFICANT CHANGE UP
CREAT SERPL-MCNC: 0.53 MG/DL — SIGNIFICANT CHANGE UP (ref 0.5–1.3)
CREAT SERPL-MCNC: 0.53 MG/DL — SIGNIFICANT CHANGE UP (ref 0.5–1.3)
DIFF PNL FLD: NEGATIVE — SIGNIFICANT CHANGE UP
DIFF PNL FLD: NEGATIVE — SIGNIFICANT CHANGE UP
EGFR: 121 ML/MIN/1.73M2 — SIGNIFICANT CHANGE UP
EGFR: 121 ML/MIN/1.73M2 — SIGNIFICANT CHANGE UP
EOSINOPHIL # BLD AUTO: 0.27 K/UL — SIGNIFICANT CHANGE UP (ref 0–0.5)
EOSINOPHIL # BLD AUTO: 0.27 K/UL — SIGNIFICANT CHANGE UP (ref 0–0.5)
EOSINOPHIL NFR BLD AUTO: 3.1 % — SIGNIFICANT CHANGE UP (ref 0–6)
EOSINOPHIL NFR BLD AUTO: 3.1 % — SIGNIFICANT CHANGE UP (ref 0–6)
GLUCOSE SERPL-MCNC: 107 MG/DL — HIGH (ref 70–99)
GLUCOSE SERPL-MCNC: 107 MG/DL — HIGH (ref 70–99)
GLUCOSE UR QL: NEGATIVE MG/DL — SIGNIFICANT CHANGE UP
GLUCOSE UR QL: NEGATIVE MG/DL — SIGNIFICANT CHANGE UP
HCG SERPL-ACNC: <1 MIU/ML — SIGNIFICANT CHANGE UP
HCG SERPL-ACNC: <1 MIU/ML — SIGNIFICANT CHANGE UP
HCT VFR BLD CALC: 39.2 % — SIGNIFICANT CHANGE UP (ref 34.5–45)
HCT VFR BLD CALC: 39.2 % — SIGNIFICANT CHANGE UP (ref 34.5–45)
HGB BLD-MCNC: 14.4 G/DL — SIGNIFICANT CHANGE UP (ref 11.5–15.5)
HGB BLD-MCNC: 14.4 G/DL — SIGNIFICANT CHANGE UP (ref 11.5–15.5)
IMM GRANULOCYTES NFR BLD AUTO: 0.3 % — SIGNIFICANT CHANGE UP (ref 0–0.9)
IMM GRANULOCYTES NFR BLD AUTO: 0.3 % — SIGNIFICANT CHANGE UP (ref 0–0.9)
KETONES UR-MCNC: NEGATIVE MG/DL — SIGNIFICANT CHANGE UP
KETONES UR-MCNC: NEGATIVE MG/DL — SIGNIFICANT CHANGE UP
LEUKOCYTE ESTERASE UR-ACNC: NEGATIVE — SIGNIFICANT CHANGE UP
LEUKOCYTE ESTERASE UR-ACNC: NEGATIVE — SIGNIFICANT CHANGE UP
LYMPHOCYTES # BLD AUTO: 2.28 K/UL — SIGNIFICANT CHANGE UP (ref 1–3.3)
LYMPHOCYTES # BLD AUTO: 2.28 K/UL — SIGNIFICANT CHANGE UP (ref 1–3.3)
LYMPHOCYTES # BLD AUTO: 25.8 % — SIGNIFICANT CHANGE UP (ref 13–44)
LYMPHOCYTES # BLD AUTO: 25.8 % — SIGNIFICANT CHANGE UP (ref 13–44)
MCHC RBC-ENTMCNC: 32.5 PG — SIGNIFICANT CHANGE UP (ref 27–34)
MCHC RBC-ENTMCNC: 32.5 PG — SIGNIFICANT CHANGE UP (ref 27–34)
MCHC RBC-ENTMCNC: 36.7 GM/DL — HIGH (ref 32–36)
MCHC RBC-ENTMCNC: 36.7 GM/DL — HIGH (ref 32–36)
MCV RBC AUTO: 88.5 FL — SIGNIFICANT CHANGE UP (ref 80–100)
MCV RBC AUTO: 88.5 FL — SIGNIFICANT CHANGE UP (ref 80–100)
MONOCYTES # BLD AUTO: 0.75 K/UL — SIGNIFICANT CHANGE UP (ref 0–0.9)
MONOCYTES # BLD AUTO: 0.75 K/UL — SIGNIFICANT CHANGE UP (ref 0–0.9)
MONOCYTES NFR BLD AUTO: 8.5 % — SIGNIFICANT CHANGE UP (ref 2–14)
MONOCYTES NFR BLD AUTO: 8.5 % — SIGNIFICANT CHANGE UP (ref 2–14)
NEUTROPHILS # BLD AUTO: 5.48 K/UL — SIGNIFICANT CHANGE UP (ref 1.8–7.4)
NEUTROPHILS # BLD AUTO: 5.48 K/UL — SIGNIFICANT CHANGE UP (ref 1.8–7.4)
NEUTROPHILS NFR BLD AUTO: 62.1 % — SIGNIFICANT CHANGE UP (ref 43–77)
NEUTROPHILS NFR BLD AUTO: 62.1 % — SIGNIFICANT CHANGE UP (ref 43–77)
NITRITE UR-MCNC: NEGATIVE — SIGNIFICANT CHANGE UP
NITRITE UR-MCNC: NEGATIVE — SIGNIFICANT CHANGE UP
PH UR: 7 — SIGNIFICANT CHANGE UP (ref 5–8)
PH UR: 7 — SIGNIFICANT CHANGE UP (ref 5–8)
PLATELET # BLD AUTO: 239 K/UL — SIGNIFICANT CHANGE UP (ref 150–400)
PLATELET # BLD AUTO: 239 K/UL — SIGNIFICANT CHANGE UP (ref 150–400)
POTASSIUM SERPL-MCNC: 3.2 MMOL/L — LOW (ref 3.5–5.3)
POTASSIUM SERPL-MCNC: 3.2 MMOL/L — LOW (ref 3.5–5.3)
POTASSIUM SERPL-SCNC: 3.2 MMOL/L — LOW (ref 3.5–5.3)
POTASSIUM SERPL-SCNC: 3.2 MMOL/L — LOW (ref 3.5–5.3)
PROT SERPL-MCNC: 7.1 GM/DL — SIGNIFICANT CHANGE UP (ref 6–8.3)
PROT SERPL-MCNC: 7.1 GM/DL — SIGNIFICANT CHANGE UP (ref 6–8.3)
PROT UR-MCNC: NEGATIVE MG/DL — SIGNIFICANT CHANGE UP
PROT UR-MCNC: NEGATIVE MG/DL — SIGNIFICANT CHANGE UP
RBC # BLD: 4.43 M/UL — SIGNIFICANT CHANGE UP (ref 3.8–5.2)
RBC # BLD: 4.43 M/UL — SIGNIFICANT CHANGE UP (ref 3.8–5.2)
RBC # FLD: 11.8 % — SIGNIFICANT CHANGE UP (ref 10.3–14.5)
RBC # FLD: 11.8 % — SIGNIFICANT CHANGE UP (ref 10.3–14.5)
SODIUM SERPL-SCNC: 140 MMOL/L — SIGNIFICANT CHANGE UP (ref 135–145)
SODIUM SERPL-SCNC: 140 MMOL/L — SIGNIFICANT CHANGE UP (ref 135–145)
SP GR SPEC: <1.005 — LOW (ref 1–1.03)
SP GR SPEC: <1.005 — LOW (ref 1–1.03)
UROBILINOGEN FLD QL: 0.2 MG/DL — SIGNIFICANT CHANGE UP (ref 0.2–1)
UROBILINOGEN FLD QL: 0.2 MG/DL — SIGNIFICANT CHANGE UP (ref 0.2–1)
WBC # BLD: 8.83 K/UL — SIGNIFICANT CHANGE UP (ref 3.8–10.5)
WBC # BLD: 8.83 K/UL — SIGNIFICANT CHANGE UP (ref 3.8–10.5)
WBC # FLD AUTO: 8.83 K/UL — SIGNIFICANT CHANGE UP (ref 3.8–10.5)
WBC # FLD AUTO: 8.83 K/UL — SIGNIFICANT CHANGE UP (ref 3.8–10.5)

## 2023-11-08 PROCEDURE — 84702 CHORIONIC GONADOTROPIN TEST: CPT

## 2023-11-08 PROCEDURE — 70498 CT ANGIOGRAPHY NECK: CPT | Mod: 26,MA

## 2023-11-08 PROCEDURE — 36415 COLL VENOUS BLD VENIPUNCTURE: CPT

## 2023-11-08 PROCEDURE — 81003 URINALYSIS AUTO W/O SCOPE: CPT

## 2023-11-08 PROCEDURE — 96374 THER/PROPH/DIAG INJ IV PUSH: CPT | Mod: XU

## 2023-11-08 PROCEDURE — 96375 TX/PRO/DX INJ NEW DRUG ADDON: CPT | Mod: XU

## 2023-11-08 PROCEDURE — 80053 COMPREHEN METABOLIC PANEL: CPT

## 2023-11-08 PROCEDURE — 87086 URINE CULTURE/COLONY COUNT: CPT

## 2023-11-08 PROCEDURE — 70496 CT ANGIOGRAPHY HEAD: CPT | Mod: 26,MA

## 2023-11-08 PROCEDURE — 70498 CT ANGIOGRAPHY NECK: CPT | Mod: MA

## 2023-11-08 PROCEDURE — 70496 CT ANGIOGRAPHY HEAD: CPT | Mod: MA

## 2023-11-08 PROCEDURE — 99285 EMERGENCY DEPT VISIT HI MDM: CPT

## 2023-11-08 PROCEDURE — 99285 EMERGENCY DEPT VISIT HI MDM: CPT | Mod: 25

## 2023-11-08 PROCEDURE — 85025 COMPLETE CBC W/AUTO DIFF WBC: CPT

## 2023-11-08 RX ORDER — DEXAMETHASONE 0.5 MG/5ML
6 ELIXIR ORAL ONCE
Refills: 0 | Status: COMPLETED | OUTPATIENT
Start: 2023-11-08 | End: 2023-11-08

## 2023-11-08 RX ORDER — ACETAMINOPHEN 500 MG
975 TABLET ORAL ONCE
Refills: 0 | Status: COMPLETED | OUTPATIENT
Start: 2023-11-08 | End: 2023-11-08

## 2023-11-08 RX ORDER — SODIUM CHLORIDE 9 MG/ML
1000 INJECTION INTRAMUSCULAR; INTRAVENOUS; SUBCUTANEOUS ONCE
Refills: 0 | Status: COMPLETED | OUTPATIENT
Start: 2023-11-08 | End: 2023-11-08

## 2023-11-08 RX ORDER — KETOROLAC TROMETHAMINE 30 MG/ML
15 SYRINGE (ML) INJECTION ONCE
Refills: 0 | Status: DISCONTINUED | OUTPATIENT
Start: 2023-11-08 | End: 2023-11-08

## 2023-11-08 RX ORDER — METOCLOPRAMIDE HCL 10 MG
10 TABLET ORAL ONCE
Refills: 0 | Status: COMPLETED | OUTPATIENT
Start: 2023-11-08 | End: 2023-11-08

## 2023-11-08 RX ADMIN — Medication 975 MILLIGRAM(S): at 01:34

## 2023-11-08 RX ADMIN — Medication 10 MILLIGRAM(S): at 02:01

## 2023-11-08 RX ADMIN — SODIUM CHLORIDE 2000 MILLILITER(S): 9 INJECTION INTRAMUSCULAR; INTRAVENOUS; SUBCUTANEOUS at 01:34

## 2023-11-08 RX ADMIN — Medication 6 MILLIGRAM(S): at 02:02

## 2023-11-08 RX ADMIN — Medication 15 MILLIGRAM(S): at 02:02

## 2023-11-08 NOTE — ED ADULT NURSE NOTE - OBJECTIVE STATEMENT
Pt ambulatory to triage c/o R sided headache since 2 AM. R sided facial tingling with blurred vision onset x2 hours ago. No facial droop or slurred speech noted. Pt hx of migraines states this episode is different. MD Petersen called for stroke eval. No code stroke called. NKDA

## 2023-11-08 NOTE — ED PROVIDER NOTE - CARE PROVIDER_API CALL
Armida Vega  Neurology  [address] [facility], [place] [address]  Phone: [phone]  Fax: [phone]  Follow Up Time:

## 2023-11-08 NOTE — ED PROVIDER NOTE - PATIENT PORTAL LINK FT
You can access the FollowMyHealth Patient Portal offered by [facility] by registering at the following website: [email or web address]. By joining [employer]’s FollowMyHealth portal, you will also be able to view your health information using other applications (apps) compatible with our system.

## 2023-11-08 NOTE — ED PROVIDER NOTE - PHYSICAL EXAMINATION
General: AAOx3, NAD  HEENT: NCAT  Cardiac: Normal rate and rhythym, no murmurs, normal peripheral perfusion  Respiratory: Normal rate and effort. CTAB  GI: Soft, nondistended, nontender  Neuro: No focal deficits. AMAYA equally x4, sensation to light touch intact throughout. CN II-XII grossly intact. Speech clear. Coordination normal. Ambulates with steady gait observed. NIHSS = 0  MSK: FROMx4, no focal bony tenderness, no peripheral edema  Skin: No rash

## 2023-11-08 NOTE — ED PROVIDER NOTE - CARE PROVIDERS DIRECT ADDRESSES
[email or web address]

## 2023-11-08 NOTE — ED ADULT NURSE NOTE - NSFALLUNIVINTERV_ED_ALL_ED
Bed/Stretcher in lowest position, wheels locked, appropriate side rails in place/Call bell, personal items and telephone in reach/Instruct patient to call for assistance before getting out of bed/chair/stretcher/Non-slip footwear applied when patient is off stretcher/[place] to call system/Physically safe environment - no spills, clutter or unnecessary equipment/Purposeful proactive rounding/Room/bathroom lighting operational, light cord in reach

## 2023-11-08 NOTE — ED PROVIDER NOTE - NSFOLLOWUPINSTRUCTIONS_ED_ALL_ED_FT
Return to the Emergency Department for worsening or persistent symptoms, and/or ANY NEW OR CONCERNING SYMPTOMS. If you have issues obtaining follow up, please call: [phone] or [phone]  to obtain a doctor or specialist who takes your insurance in your area.    Cefalea migrañosa  Migraine Headache  Tomás cefalea migrañosa es un dolor intenso y punzante en carlitos o ambos lados de la aubrie. Las cefaleas migrañosas también pueden causar otros síntomas, forrest náuseas, vómitos y sensibilidad a la jacob y el ruido. Tomás cefalea migrañosa puede durar desde 4 horas hasta 3 días. Hable con guzman médico sobre los factores que pueden causar (desencadenar) las cefaleas migrañosas.    ¿Cuáles son las causas?  Se desconoce la causa exacta de esta afección. Sin embargo, tomás migraña puede aparecer cuando los nervios del cerebro se irritan y liberan ciertas sustancias químicas que causan inflamación de los vasos sanguíneos. Esta inflamación provoca dolor. Esta afección puede desencadenarse o ser causada por lo siguiente:  Consumo de alcohol.  Consumo de cigarrillos.  Steve medicamentos forrest por ejemplo:  Medicamentos para aliviar el dolor torácico (nitroglicerina).  Anticonceptivos orales.  Estrógeno.  Ciertos medicamentos para la presión arterial.  [place] o beber productos que contienen nitratos, glutamato, aspartamo o tiramina. Los quesos añejados, el chocolate o la cafeína también pueden ser desencadenantes.  Hacer actividad física.  Otros factores que pueden provocar cefalea migrañosa son los siguientes:  Menstruación.  Embarazo.  Hambre.  Estrés.  Dormir poco o dormir demasiado.  Cambios climáticos.  Fatiga.  ¿Qué incrementa el riesgo?  Los siguientes factores pueden hacer que usted sea más propenso a tener migrañas:  Tener cierta edad. Es más probable que esta afección se manifieste en personas que tienen entre 25 y 55 años.  Ser marychuy.  Tener antecedentes familiares de cefalea migrañosa.  Ser de johnson caucásica.  Tener tomás afección de sony mental, forrest depresión o ansiedad.  Ser adilia.  ¿Cuáles son los signos o los síntomas?  El principal síntoma de esta afección es el dolor intenso y punzante. Bere dolor puede tener las siguientes características:  Puede aparecer en cualquier región de la aubrie, tanto de un lado forrest de ambos.  Puede interferir con las actividades de la piter cotidiana.  Puede empeorar con la actividad física.  Puede empeorar ante la exposición a luces brillantes o a ruidos nadia.  Otros síntomas pueden incluir:  Náuseas.  Vómitos.  Mareos.  Sensibilidad general a las luces brillantes, a los ruidos nadia o a los olores.  Antes de tener tomás cefalea migrañosa, puede recibir señales de advertencia (aura). Un aura puede incluir:  Jean-Claude luces intermitentes o tener puntos ciegos.  Jean-Claude puntos brillantes, halos o líneas en zigzag.  Tener tomás visión en túnel o visión borrosa.  Sentir entumecimiento u hormigueo.  Tener dificultad para hablar.  Debilidad muscular.  Algunas personas tienen síntomas después de tomás cefalea migrañosa (fase posdromal), forrest los siguientes:  Cansancio.  Dificultad para concentrarte.  ¿Cómo se diagnostica?  La cefalea migrañosa se diagnostica en función de lo siguiente:  Maxine síntomas.  Un examen físico.  Pruebas, forrest, por ejemplo:  Tomografía computarizada (TC) o resonancia magnética (RM) de la aubrie. Estos estudios de diagnóstico por imágenes pueden ayudar a descartar otras causas de cefalea.  Steve tomás muestra de líquido de la médula tilley (punción lumbar) para analizar (análisis de líquido cefalorraquídeo o análisis de LCR).  ¿Cómo se trata?  Esta afección se puede tratar con medicamentos para:  Aliviar el dolor.  Aliviar las náuseas.  Prevenir las cefaleas migrañosas.  El tratamiento de esta afección también puede incluir lo siguiente:  Acupuntura.  Cambios en el estilo de piter, forrest evitar los alimentos que provocan las cefaleas migrañosas.  Biorretroalimentación.  Terapia cognitiva conductual.  Siga estas instrucciones en guzman casa:  Medicamentos    Use los medicamentos de venta mu y los recetados solamente forrest se lo haya indicado el médico.  Pregúntele al médico si el medicamento recetado:  Hace que sea necesario que evite conducir o usar maquinaria pesada.  Puede causarle estreñimiento. Es posible que tenga que steve estas medidas para prevenir o tratar el estreñimiento:  Beber suficiente líquido forrest para mantener la orina de color amarillo pálido.  Steve medicamentos recetados o de venta mu.  Consumir alimentos ricos en fibra, forrest frijoles, cereales integrales, y frutas y verduras frescas.  Limitar el consumo de alimentos ricos en grasa y azúcares procesados, forrest los alimentos fritos o dulces.  Estilo de piter    No tirsh alcohol.  No consuma ningún producto que contenga nicotina o tabaco, forrest cigarrillos, cigarrillos electrónicos y tabaco de mascar. Si necesita ayuda para dejar de fumar, consulte al médico.  Duerma forrest mínimo 8 horas todas las noches.  Encuentre modos de manejar el estrés, por ejemplo, a través de la meditación, la respiración profunda o el yoga.  Indicaciones generales        Lleve un registro diario para averiguar lo que puede provocar las cefaleas migrañosas. Registre, por ejemplo, lo siguiente:  Lo que usted come y marci.  El tiempo que duerme.  Algún cambio en guzman dieta o en los medicamentos.  Si tiene tomás cefalea migrañosa:  Evite los factores que empeoren los síntomas, forerst las luces brillantes.  Resulta útil acostarse en tomás habitación oscura y silenciosa.  No conduzca vehículos ni opere maquinaria pesada.  Pregúntele al médico qué actividades son seguras para usted cuando tiene síntomas.  Concurra a todas las visitas de seguimiento forrest se lo haya indicado el médico. [place] es importante.  Comuníquese con un médico si:  Tiene síntomas de cefalea migrañosa que son distintos o más intensos que los habituales.  Tiene más de 15 días de cefalea por mes.  Solicite ayuda inmediatamente si:  La cefalea migrañosa se vuelve cada vez más intensa.  La cefalea migrañosa dura más de 72 horas.  Tiene fiebre.  Presenta rigidez en el benjie.  Presenta pérdida de la visión.  Siente debilidad en los músculos o que no puede controlarlos.  Comienza a perder el equilibrio con frecuencia.  Presenta dificultad para caminar.  Se desmaya.  Tiene tomás convulsión.  Resumen  Tomás cefalea migrañosa es un dolor intenso y punzante en carlitos o ambos lados de la aubrie. Las migrañas también pueden causar otros síntomas, forrest náuseas, vómitos y sensibilidad a la jacob y el ruido.  Esta afección puede tratarse con medicamentos y cambios en el estilo de piter. También es posible que deba evitar ciertos factores que desencadenan tomás cefalea migrañosa.  Lleve un registro diario para averiguar lo que puede provocar las cefaleas migrañosas.  Comuníquese con el médico si tiene más de 15 días de cefalea en un mes o presenta síntomas de cefalea migrañosa que son distintos o más intensos que los habituales.  Esta información no tiene forrest fin reemplazar el consejo del médico. Asegúrese de hacerle al médico cualquier pregunta que tenga.

## 2023-11-08 NOTE — ED PROVIDER NOTE - OBJECTIVE STATEMENT
38-year-old female with history of migraines presents with headache, facial tingling, vision changes.  She reports that earlier this morning she developed right posterior/occipital headache.  Reports her migraines are usually behind the right eye this feels different.  About 2 hours ago she started to develop tingling on the right side of the face and occasional blurring and jagged lines in the right eye visual field.  Denies any trauma, denies fever or neck pain.  Did not take medications prior to arrival.  Denies recent travel, tick bite, rash

## 2023-11-08 NOTE — ED ADULT TRIAGE NOTE - CHIEF COMPLAINT QUOTE
Pt ambulatory to triage c/o R sided headache since this AM. R sided facial tingling with blurred vision onset x2 hours ago. No facial droop or slurred speech noted. Pt hx of migraines states this episode is different. MD Petersen called for stroke eval. No code stroke called. NKDA

## 2023-11-08 NOTE — ED PROVIDER NOTE - CLINICAL SUMMARY MEDICAL DECISION MAKING FREE TEXT BOX
Well appearing pt p/w R occipital HA all day today, now with R facial paresthesias and intermittent R eye visual disturbances. Fully neuro intact, NIHSS=0. No code stroke indicated. No signs of bells/facial palsy on exam. Pt describes visual disturbance as squiggly lines and some flashing, may be c/w scintillating scotoma in conjunction with complex migraine. Given this HA is new for her, will obtain imaging to r/o other causes, provide migraine meds and reeval Well appearing pt p/w R occipital HA all day today, now with R facial paresthesias and intermittent R eye visual disturbances. Fully neuro intact, NIHSS=0. No code stroke indicated. No signs of bells/facial palsy on exam. Pt describes visual disturbance as squiggly lines and some flashing, may be c/w scintillating scotoma in conjunction with complex migraine. Given this HA is new for her, will obtain imaging to r/o other causes, provide migraine meds and reeval  -Labs and imaging unremarkable. Pt feels significantly improved with migraine meds, pain resolved, visual disturbance resolved, facial paresthesias nearly resolved. Sx c/w complex migraine. Stable for DC, fu PMD and/or neurology

## 2023-11-09 LAB
CULTURE RESULTS: ABNORMAL
CULTURE RESULTS: ABNORMAL
SPECIMEN SOURCE: SIGNIFICANT CHANGE UP
SPECIMEN SOURCE: SIGNIFICANT CHANGE UP

## 2023-12-21 NOTE — ED STATDOCS - OBJECTIVE STATEMENT
32 y/o female with no pertinent past medical history presents to the ED c/o urinary symptoms for the past two days where she believes it is a UTI, but for the past day she had hematuria. Pt confirms burning when she urinates and has pain. You can access the FollowMyHealth Patient Portal offered by [facility] by registering at the following website: [email or web address]. By joining [employer]’s FollowMyHealth portal, you will also be able to view your health information using other applications (apps) compatible with our system. You can access the FollowMyHealth Patient Portal offered by [facility] by registering at the following website: [email or web address]. By joining [email or web address]’s FollowMyHealth portal, you will also be able to view your health information using other applications (apps) compatible with our system.

## 2024-01-15 ENCOUNTER — APPOINTMENT (OUTPATIENT)
Dept: ULTRASOUND IMAGING | Facility: CLINIC | Age: 39
End: 2024-01-15
Payer: COMMERCIAL

## 2024-01-15 ENCOUNTER — OUTPATIENT (OUTPATIENT)
Dept: OUTPATIENT SERVICES | Facility: HOSPITAL | Age: 39
LOS: 1 days | End: 2024-01-15
Payer: COMMERCIAL

## 2024-01-15 DIAGNOSIS — D25.9 LEIOMYOMA OF UTERUS, UNSPECIFIED: ICD-10-CM

## 2024-01-15 PROCEDURE — 76856 US EXAM PELVIC COMPLETE: CPT

## 2024-01-15 PROCEDURE — 76830 TRANSVAGINAL US NON-OB: CPT

## 2024-01-15 PROCEDURE — 76856 US EXAM PELVIC COMPLETE: CPT | Mod: 26

## 2024-01-15 PROCEDURE — 76830 TRANSVAGINAL US NON-OB: CPT | Mod: 26

## 2024-06-03 NOTE — H&P PST ADULT - ADMIT DATE
Bed/Stretcher in lowest position, wheels locked, appropriate side rails in place/Call bell, personal items and telephone in reach/Instruct patient to call for assistance before getting out of bed/chair/stretcher/Non-slip footwear applied when patient is off stretcher/[place] to call system/Physically safe environment - no spills, clutter or unnecessary equipment/Purposeful proactive rounding/Room/bathroom lighting operational, light cord in reach 05-Oct-2021

## 2025-09-11 ENCOUNTER — APPOINTMENT (OUTPATIENT)
Dept: DERMATOLOGY | Facility: HOSPITAL | Age: 40
End: 2025-09-11